# Patient Record
Sex: MALE | Race: WHITE | NOT HISPANIC OR LATINO | URBAN - METROPOLITAN AREA
[De-identification: names, ages, dates, MRNs, and addresses within clinical notes are randomized per-mention and may not be internally consistent; named-entity substitution may affect disease eponyms.]

---

## 2018-01-01 ENCOUNTER — OFFICE VISIT (OUTPATIENT)
Dept: URBAN - METROPOLITAN AREA CLINIC 17 | Facility: CLINIC | Age: 78
End: 2018-01-01
Payer: MEDICARE

## 2018-01-01 DIAGNOSIS — H04.123 DRY EYE SYNDROME OF BILATERAL LACRIMAL GLANDS: ICD-10-CM

## 2018-01-01 DIAGNOSIS — Z96.1 PRESENCE OF INTRAOCULAR LENS: ICD-10-CM

## 2018-01-01 DIAGNOSIS — H26.492 OTHER SECONDARY CATARACT, LEFT EYE: ICD-10-CM

## 2018-01-01 DIAGNOSIS — H40.1132 PRIMARY OPEN-ANGLE GLAUCOMA, BILATERAL, MODERATE STAGE: Primary | ICD-10-CM

## 2018-01-01 PROCEDURE — 92012 INTRM OPH EXAM EST PATIENT: CPT | Performed by: OPTOMETRIST

## 2018-01-01 RX ORDER — LATANOPROST 50 UG/ML
0.005 % SOLUTION OPHTHALMIC
Qty: 3 | Refills: 3 | Status: ACTIVE
Start: 2018-01-01

## 2018-01-01 ASSESSMENT — INTRAOCULAR PRESSURE
OS: 13
OD: 12

## 2018-06-18 ENCOUNTER — OFFICE VISIT (OUTPATIENT)
Dept: URBAN - METROPOLITAN AREA CLINIC 17 | Facility: CLINIC | Age: 78
End: 2018-06-18
Payer: MEDICARE

## 2018-06-18 PROCEDURE — 92133 CPTRZD OPH DX IMG PST SGM ON: CPT | Performed by: OPTOMETRIST

## 2018-06-18 PROCEDURE — 92083 EXTENDED VISUAL FIELD XM: CPT | Performed by: OPTOMETRIST

## 2018-06-18 PROCEDURE — 99214 OFFICE O/P EST MOD 30 MIN: CPT | Performed by: OPTOMETRIST

## 2018-06-18 RX ORDER — TIMOLOL MALEATE 5 MG/ML
0.5 % SOLUTION/ DROPS OPHTHALMIC
Qty: 3 | Refills: 3 | Status: ACTIVE
Start: 2018-06-18

## 2018-06-18 RX ORDER — LATANOPROST 50 UG/ML
0.005 % SOLUTION OPHTHALMIC
Qty: 1 | Refills: 6 | Status: INACTIVE
Start: 2018-06-18 | End: 2018-01-01

## 2018-06-18 ASSESSMENT — INTRAOCULAR PRESSURE
OS: 15
OS: 8
OD: 14
OD: 8

## 2018-06-18 NOTE — IMPRESSION/PLAN
Impression: Dry eye syndrome of bilateral lacrimal glands: H04.123. Plan: Discussed diagnosis in detail with patient. Advised patient of condition. Discussed treatment options with patient. Recommend Refresh AFT's or Systane.

## 2018-06-18 NOTE — IMPRESSION/PLAN
Impression: Diagnosis: Primary open-angle glaucoma, bilateral, moderate stage. Code: V26.5741. Plan: Continue using current medications Timolol QAM OD Only and Latanaprost QHS OU. Slightly Elevated IOP today, stressed compliance with drops.  
OCT 63/79 (58/77) (58/77)(60/74) VF OD: Sup/Inf Arc Tato OS: Para BJW

## 2018-08-15 NOTE — IMPRESSION/PLAN
Impression: Diagnosis: Primary open-angle glaucoma, bilateral, moderate stage. Code: S35.7447. IOP lower OU Plan: Continue Timolol QAM OD Only and Latanaprost QHS OU(Erx'd today) OCT 63/79 (58/77) (58/77)(60/74) VF OD: Sup/Inf Arc Tato OS: Para BJW

## 2018-08-15 NOTE — IMPRESSION/PLAN
Impression: Other secondary cataract, left eye: H26.492. Plan: Discussed diagnosis in detail with patient. Advised patient of condition. Discussed treatment options with patient.